# Patient Record
Sex: FEMALE | Race: WHITE | NOT HISPANIC OR LATINO | Employment: FULL TIME | URBAN - METROPOLITAN AREA
[De-identification: names, ages, dates, MRNs, and addresses within clinical notes are randomized per-mention and may not be internally consistent; named-entity substitution may affect disease eponyms.]

---

## 2019-09-11 ENCOUNTER — OFFICE VISIT (OUTPATIENT)
Dept: URGENT CARE | Facility: CLINIC | Age: 59
End: 2019-09-11
Payer: COMMERCIAL

## 2019-09-11 VITALS
HEIGHT: 62 IN | TEMPERATURE: 96.9 F | BODY MASS INDEX: 41.96 KG/M2 | RESPIRATION RATE: 18 BRPM | OXYGEN SATURATION: 95 % | HEART RATE: 92 BPM | WEIGHT: 228 LBS | SYSTOLIC BLOOD PRESSURE: 136 MMHG | DIASTOLIC BLOOD PRESSURE: 60 MMHG

## 2019-09-11 DIAGNOSIS — H66.002 ACUTE SUPPURATIVE OTITIS MEDIA OF LEFT EAR WITHOUT SPONTANEOUS RUPTURE OF TYMPANIC MEMBRANE, RECURRENCE NOT SPECIFIED: ICD-10-CM

## 2019-09-11 DIAGNOSIS — J01.40 ACUTE PANSINUSITIS, RECURRENCE NOT SPECIFIED: ICD-10-CM

## 2019-09-11 PROCEDURE — 99203 OFFICE O/P NEW LOW 30 MIN: CPT | Performed by: PHYSICIAN ASSISTANT

## 2019-09-11 RX ORDER — AMOXICILLIN AND CLAVULANATE POTASSIUM 875; 125 MG/1; MG/1
1 TABLET, FILM COATED ORAL 2 TIMES DAILY
Qty: 14 TAB | Refills: 0 | Status: SHIPPED | OUTPATIENT
Start: 2019-09-11 | End: 2019-09-18

## 2019-09-11 ASSESSMENT — ENCOUNTER SYMPTOMS
NECK PAIN: 0
FEVER: 0
MYALGIAS: 0
COUGH: 1
SINUS PAIN: 1
VOMITING: 0
CHANGE IN BOWEL HABIT: 0
FATIGUE: 1
ABDOMINAL PAIN: 0
WHEEZING: 0
EYE REDNESS: 0
SORE THROAT: 0
EYE DISCHARGE: 0
DIZZINESS: 0
HEADACHES: 1
DIARRHEA: 0
TINGLING: 0
CHILLS: 1

## 2019-09-11 NOTE — PROGRESS NOTES
"Subjective:      Leticia Duong is a 59 y.o. female who presents with Sinus Pain (x4days, chills, sweats, coughing up mucus, left sinus and ear pain, left side of face painful, fatigue)            Pt is a 60 y/o female with left sinus pain and pressure with radiation into her left ear for the last few days. She does report recent nasal congestion of which she thought might of been a \"cold\"- however her symptoms have worsened the last few days. She does report slight productive cough as well.   She is visiting from [place] at this time- she does report feeling ill prior to leaving.     Sinus Pain   This is a new problem. The current episode started 1 to 4 weeks ago. The problem occurs constantly. Associated symptoms include chills, congestion, coughing, fatigue and headaches. Pertinent negatives include no abdominal pain, change in bowel habit, chest pain, fever, myalgias, neck pain, rash, sore throat or vomiting. Associated symptoms comments: Pos subjective fevers  . Nothing aggravates the symptoms. Treatments tried: Allergy meds, Ibuprofen.  The treatment provided moderate relief.       Review of Systems   Constitutional: Positive for chills, fatigue and malaise/fatigue. Negative for fever.   HENT: Positive for congestion, ear pain and sinus pain. Negative for ear discharge and sore throat.         Pos. For ear pressure     Eyes: Negative for discharge and redness.   Respiratory: Positive for cough. Negative for wheezing.    Cardiovascular: Negative for chest pain and leg swelling.   Gastrointestinal: Negative for abdominal pain, change in bowel habit, diarrhea and vomiting.   Genitourinary: Negative for dysuria and urgency.   Musculoskeletal: Negative for myalgias and neck pain.   Skin: Negative for itching and rash.   Neurological: Positive for headaches. Negative for dizziness and tingling.          Objective:     /60 (BP Location: Left arm, Patient Position: Sitting, BP Cuff Size: Adult)   Pulse 92   Temp " "36.1 °C (96.9 °F) (Temporal)   Resp 18   Ht 1.575 m (5' 2\")   Wt 103.4 kg (228 lb)   SpO2 95%   BMI 41.70 kg/m²    Vitals were rechecked by myself and were updated.       Physical Exam   Constitutional: She is oriented to person, place, and time. She appears well-developed and well-nourished.   HENT:   Head: Normocephalic and atraumatic.   Mouth/Throat: No oropharyngeal exudate.   Left TM with noted tympanosclerosis with noted erythema.   Posterior oropharynx with pos. PND without tonsillar edema or erythema.   Nose- boggy turbinates with mild-moderate amount of nasal discharge. Bilateral maxillary sinus tenderness with percussion- worse on the left    Eyes: Pupils are equal, round, and reactive to light. EOM are normal.   Neck: Normal range of motion. Neck supple.   Cardiovascular: Normal rate and regular rhythm.   Pulmonary/Chest: Effort normal and breath sounds normal. No respiratory distress. She has no wheezes.   Musculoskeletal: Normal range of motion. She exhibits no edema.   Lymphadenopathy:     She has no cervical adenopathy.   Neurological: She is alert and oriented to person, place, and time.   Skin: Skin is warm. No rash noted.   Psychiatric: She has a normal mood and affect. Her behavior is normal.   Vitals reviewed.              Assessment/Plan:     1. Acute pansinusitis, recurrence not specified  - amoxicillin-clavulanate (AUGMENTIN) 875-125 MG Tab; Take 1 Tab by mouth 2 times a day for 7 days.  Dispense: 14 Tab; Refill: 0    2. Acute suppurative otitis media of left ear without spontaneous rupture of tympanic membrane, recurrence not specified  - amoxicillin-clavulanate (AUGMENTIN) 875-125 MG Tab; Take 1 Tab by mouth 2 times a day for 7 days.  Dispense: 14 Tab; Refill: 0      Continue OTC supportive therapies- Flonase, OTC allergy meds, avoid night time dairy. Increase fluids. Humidification.   Patient given precautionary s/sx that mandate immediate follow up and evaluation in the ED. Advised of " risks of not doing so.    DDX, Supportive care, and indications for immediate follow-up discussed with patient.    Instructed to return to clinic or nearest emergency department if we are not available for any change in condition, further concerns, or worsening of symptoms.    The patient demonstrated a good understanding and agreed with the treatment plan    Please note that this dictation was created using voice recognition software. I have made every reasonable attempt to correct obvious errors, but I expect that there are errors of grammar and possibly content that I did not discover before finalizing the note.